# Patient Record
Sex: FEMALE | Race: WHITE | URBAN - NONMETROPOLITAN AREA
[De-identification: names, ages, dates, MRNs, and addresses within clinical notes are randomized per-mention and may not be internally consistent; named-entity substitution may affect disease eponyms.]

---

## 2022-03-31 ENCOUNTER — OFFICE VISIT (OUTPATIENT)
Dept: URBAN - NONMETROPOLITAN AREA CLINIC 4 | Facility: CLINIC | Age: 47
End: 2022-03-31
Payer: COMMERCIAL

## 2022-03-31 ENCOUNTER — WEB ENCOUNTER (OUTPATIENT)
Dept: URBAN - NONMETROPOLITAN AREA CLINIC 4 | Facility: CLINIC | Age: 47
End: 2022-03-31

## 2022-03-31 ENCOUNTER — LAB OUTSIDE AN ENCOUNTER (OUTPATIENT)
Dept: URBAN - NONMETROPOLITAN AREA CLINIC 4 | Facility: CLINIC | Age: 47
End: 2022-03-31

## 2022-03-31 VITALS
BODY MASS INDEX: 23.56 KG/M2 | TEMPERATURE: 97.7 F | WEIGHT: 141.4 LBS | HEIGHT: 65 IN | DIASTOLIC BLOOD PRESSURE: 64 MMHG | HEART RATE: 79 BPM | SYSTOLIC BLOOD PRESSURE: 88 MMHG

## 2022-03-31 DIAGNOSIS — K64.9 HEMORRHOIDS, UNSPECIFIED HEMORRHOID TYPE: ICD-10-CM

## 2022-03-31 DIAGNOSIS — R15.0 INCOMPLETE DEFECATION: ICD-10-CM

## 2022-03-31 DIAGNOSIS — Z80.0 FAMILY HISTORY OF COLON CANCER: ICD-10-CM

## 2022-03-31 DIAGNOSIS — K62.5 RECTAL BLEEDING: ICD-10-CM

## 2022-03-31 DIAGNOSIS — R19.4 CHANGE IN BOWEL HABITS: ICD-10-CM

## 2022-03-31 PROCEDURE — 99204 OFFICE O/P NEW MOD 45 MIN: CPT | Performed by: REGISTERED NURSE

## 2022-03-31 RX ORDER — SODIUM PICOSULFATE, MAGNESIUM OXIDE, AND ANHYDROUS CITRIC ACID 10; 3.5; 12 MG/160ML; G/160ML; G/160ML
160 ML LIQUID ORAL
Qty: 320 MILLILITER | Refills: 0 | OUTPATIENT
Start: 2022-03-31 | End: 2022-04-01

## 2022-03-31 NOTE — HPI-TODAY'S VISIT:
3/31/22: Pt is a 45 yo female with PMH of migraines who was referred by JUSTINO Veloz, for evaluation of incomplete defecation.  A copy of this document will be sent to the referring provider.  Pt reports changes in bowel habits for less than a year. Does not feel like completely evacuates. Typically has a BM daily. Sits on toilet for at least 10 minutes. Stools are solid/formed. Denies pencil thin stools or hard stools. Reports associated bilateral abdominal pain. Has noticed blood in stool more so when she's constipated. Has noticed jonathon blood in toilet. She has known hemorrhoids. Denies any unintentional weight loss. Patient has never had a colonoscopy. There is family history of colon cancer in sister (Diagnosed around 52 yo). She is currently taking Bonafide for vaginal health.

## 2022-05-13 ENCOUNTER — OFFICE VISIT (OUTPATIENT)
Dept: URBAN - METROPOLITAN AREA SURGERY CENTER 14 | Facility: SURGERY CENTER | Age: 47
End: 2022-05-13

## 2022-05-27 ENCOUNTER — OFFICE VISIT (OUTPATIENT)
Dept: URBAN - NONMETROPOLITAN AREA CLINIC 4 | Facility: CLINIC | Age: 47
End: 2022-05-27

## 2022-06-07 ENCOUNTER — OFFICE VISIT (OUTPATIENT)
Dept: URBAN - METROPOLITAN AREA SURGERY CENTER 14 | Facility: SURGERY CENTER | Age: 47
End: 2022-06-07
Payer: COMMERCIAL

## 2022-06-07 DIAGNOSIS — K92.1 ACUTE MELENA: ICD-10-CM

## 2022-06-07 DIAGNOSIS — R19.4 ALTERATION IN BOWEL ELIMINATION: ICD-10-CM

## 2022-06-07 PROCEDURE — 45378 DIAGNOSTIC COLONOSCOPY: CPT | Performed by: INTERNAL MEDICINE

## 2022-06-07 PROCEDURE — G8907 PT DOC NO EVENTS ON DISCHARG: HCPCS | Performed by: INTERNAL MEDICINE

## 2022-06-15 ENCOUNTER — OFFICE VISIT (OUTPATIENT)
Dept: URBAN - METROPOLITAN AREA SURGERY CENTER 14 | Facility: SURGERY CENTER | Age: 47
End: 2022-06-15

## 2022-06-21 ENCOUNTER — DASHBOARD ENCOUNTERS (OUTPATIENT)
Age: 47
End: 2022-06-21

## 2022-06-21 ENCOUNTER — OFFICE VISIT (OUTPATIENT)
Dept: URBAN - NONMETROPOLITAN AREA CLINIC 4 | Facility: CLINIC | Age: 47
End: 2022-06-21
Payer: COMMERCIAL

## 2022-06-21 VITALS
WEIGHT: 137.6 LBS | DIASTOLIC BLOOD PRESSURE: 64 MMHG | BODY MASS INDEX: 22.92 KG/M2 | TEMPERATURE: 97.7 F | HEIGHT: 65 IN | SYSTOLIC BLOOD PRESSURE: 90 MMHG | HEART RATE: 83 BPM

## 2022-06-21 DIAGNOSIS — Z80.0 FAMILY HISTORY OF COLON CANCER: ICD-10-CM

## 2022-06-21 DIAGNOSIS — K62.89 HYPERTROPHIED ANAL PAPILLA: ICD-10-CM

## 2022-06-21 DIAGNOSIS — R15.0 INCOMPLETE DEFECATION: ICD-10-CM

## 2022-06-21 PROCEDURE — 99213 OFFICE O/P EST LOW 20 MIN: CPT | Performed by: REGISTERED NURSE

## 2022-06-21 NOTE — HPI-TODAY'S VISIT:
3/31/22: Pt is a 47 yo female with PMH of migraines who was referred by JUSTINO Veloz, for evaluation of incomplete defecation.  A copy of this document will be sent to the referring provider.  Pt reports changes in bowel habits for less than a year. Does not feel like completely evacuates. Typically has a BM daily. Sits on toilet for at least 10 minutes. Stools are solid/formed. Denies pencil thin stools or hard stools. Reports associated bilateral abdominal pain. Has noticed blood in stool more so when she's constipated. Has noticed jonathon blood in toilet. She has known hemorrhoids. Denies any unintentional weight loss. Patient has never had a colonoscopy. There is family history of colon cancer in sister (Diagnosed around 52 yo). She is currently taking Bonafide for vaginal health.  6/21/22: Pt RTC for f/u. Had cscope 6/7/22: - Perianal skin tags found on perianal exam. - Anal papilla(e) were hypertrophied. - The examination was otherwise normal. - The examined portion of the ileum was normal. - No specimens collected. She continues to c/o incomplete evacuation and sitting on toilet for at least 10 minutes to have a BM. Has not gotten Rx for Diltiazem topical filled yet. Has not started fiber supplement yet. No other GI complaints,

## 2023-08-16 ENCOUNTER — TELEPHONE ENCOUNTER (OUTPATIENT)
Dept: URBAN - METROPOLITAN AREA CLINIC 23 | Facility: CLINIC | Age: 48
End: 2023-08-16